# Patient Record
Sex: FEMALE | Race: BLACK OR AFRICAN AMERICAN | NOT HISPANIC OR LATINO | URBAN - METROPOLITAN AREA
[De-identification: names, ages, dates, MRNs, and addresses within clinical notes are randomized per-mention and may not be internally consistent; named-entity substitution may affect disease eponyms.]

---

## 2021-11-17 ENCOUNTER — EMERGENCY (EMERGENCY)
Facility: HOSPITAL | Age: 51
LOS: 0 days | Discharge: LEFT BEFORE DISCHARGE | End: 2021-11-17
Payer: SELF-PAY

## 2021-11-17 VITALS
HEIGHT: 64 IN | RESPIRATION RATE: 15 BRPM | WEIGHT: 139.99 LBS | TEMPERATURE: 98 F | SYSTOLIC BLOOD PRESSURE: 170 MMHG | DIASTOLIC BLOOD PRESSURE: 101 MMHG | OXYGEN SATURATION: 100 % | HEART RATE: 69 BPM

## 2021-11-17 VITALS
RESPIRATION RATE: 18 BRPM | HEART RATE: 69 BPM | DIASTOLIC BLOOD PRESSURE: 103 MMHG | SYSTOLIC BLOOD PRESSURE: 169 MMHG | OXYGEN SATURATION: 100 % | TEMPERATURE: 98 F

## 2021-11-17 DIAGNOSIS — Z91.013 ALLERGY TO SEAFOOD: ICD-10-CM

## 2021-11-17 DIAGNOSIS — I10 ESSENTIAL (PRIMARY) HYPERTENSION: ICD-10-CM

## 2021-11-17 DIAGNOSIS — Z53.29 PROCEDURE AND TREATMENT NOT CARRIED OUT BECAUSE OF PATIENT'S DECISION FOR OTHER REASONS: ICD-10-CM

## 2021-11-17 DIAGNOSIS — Z23 ENCOUNTER FOR IMMUNIZATION: ICD-10-CM

## 2021-11-17 DIAGNOSIS — Z91.012 ALLERGY TO EGGS: ICD-10-CM

## 2021-11-17 PROCEDURE — 99283 EMERGENCY DEPT VISIT LOW MDM: CPT

## 2021-11-17 RX ORDER — BNT162B2 0.23 MG/2.25ML
0.3 INJECTION, SUSPENSION INTRAMUSCULAR ONCE
Refills: 0 | Status: COMPLETED | OUTPATIENT
Start: 2021-11-17 | End: 2021-11-17

## 2021-11-17 RX ORDER — AMLODIPINE BESYLATE 2.5 MG/1
1 TABLET ORAL
Qty: 0 | Refills: 0 | DISCHARGE

## 2021-11-17 RX ORDER — AMLODIPINE BESYLATE 2.5 MG/1
10 TABLET ORAL ONCE
Refills: 0 | Status: COMPLETED | OUTPATIENT
Start: 2021-11-17 | End: 2021-11-17

## 2021-11-17 RX ADMIN — AMLODIPINE BESYLATE 10 MILLIGRAM(S): 2.5 TABLET ORAL at 20:27

## 2021-11-17 RX ADMIN — BNT162B2 0.3 MILLILITER(S): 0.23 INJECTION, SUSPENSION INTRAMUSCULAR at 20:05

## 2021-11-17 NOTE — ED ADULT NURSE REASSESSMENT NOTE - NS ED NURSE REASSESS COMMENT FT1
no adverse reaction noted from covid vaccine 2nd dose pt requests d/c papers alejandro simpson instructed pt he wants to wait to repeat bp after amlodipine dose given here has time to take effect pt to sign ama as she doesn't want to remain in er for ongoing bp management pt with known hx htn non compliant with rx amlodipine denies any headache or dizziness at present

## 2021-11-17 NOTE — ED PROVIDER NOTE - NSFOLLOWUPCLINICS_GEN_ALL_ED_FT
[facility] - Primary Care  Primary Care  [address] [place] [address]  Phone: [phone]  Fax:

## 2021-11-17 NOTE — ED ADULT NURSE NOTE - OBJECTIVE STATEMENT
received ft requesting 2nd dose pfizer vaccine 1st dose given 9/24/21 denies any symptoms or known exposure states needs it for her employer

## 2021-11-17 NOTE — ED PROVIDER NOTE - PROGRESS NOTE DETAILS
AAox3 non toxic looking afebrile sitting in bed in NAD. patient is not psychotic, not hearing voice and capable to make decisions. patient updated about her BP being elevated. patient given amlodipine 10 mg fo relevated BP. patient prefer to sign out against medical advise. patient educated of the risk associated to leaving against medical advice included but no limited to death, coma, irreversible neurological physical psychosocial damage, stroke heart attack, loc, syncope, worsening symptoms. witnessed by RN. patient remains asymptomatic. patient signed out against medical advised

## 2021-11-17 NOTE — ED PROVIDER NOTE - PATIENT PORTAL LINK FT
You can access the FollowMyHealth Patient Portal offered by [facility] by registering at the following website: [email or web address]. By joining [email or web address]’s FollowMyHealth portal, you will also be able to view your health information using other applications (apps) compatible with our system.

## 2021-11-17 NOTE — ED PROVIDER NOTE - NSFOLLOWUPINSTRUCTIONS_ED_ALL_ED_FT
Please schedule to follow up within 7 days for covid19      [facility] - Primary Care   Primary Care  [address] [place] [address]  Phone: [phone]  Fax:     please take tylenol 325 mg every 4 hours as needed for pain     please return to the ED for worsening symptoms ,

## 2021-11-17 NOTE — ED ADULT NURSE NOTE - NSICDXPASTMEDICALHX_GEN_ALL_CORE_FT
PAST MEDICAL HISTORY:  No pertinent past medical history PAST MEDICAL HISTORY:  HTN (hypertension)

## 2021-11-17 NOTE — ED PROVIDER NOTE - PHYSICAL EXAMINATION
General appearance AAox3 nontoxic looking afebrile sitting in bed in NAD, breathing comfortably, speak full sentence, no tripoding  Head : NCAT, no facial swelling, no bruise, no laceration, non TTP,  EYE : b/l eyes EOMI/PERRL, no nystagmus.   Mouth : Oropharynx moist and patent, no swelling, no edema, no tongue swelling, uvula midline. no exudate, no rash, no lesion   Neck : no cervical/paracervical spine tenderness, no meningeal signs, no neck rigidity, no lymph node swelling. FROM, no body step off  chest : no bruise, no deformity, non TTP, chest expanding equally b/l, no crepitus, no swelling,   lung : CTAB no w/r/r  abdomen : soft non tender, no distended, + BS x 4 quadrant, no guarding, no CVA tenderness, no organomegaly  spine :  moving all extremity equally b/l skin intact, dry not warm to touch. no bruise, no deformity, no bony step off, no erythema, no swelling, no infection. non TTP. FROM, no sensory deficit, distal pulse intact, cap refill < 2 second, able to bare weight. strength 5/5   LE :  skin dry not warm to touch. no bruise, no deformity, no bony step off, no infection. FROM, no sensory deficit, distal pulse intact, cap refill < 2 second, able to bare weight. ambulating steadily, strength 5/5

## 2021-11-17 NOTE — ED PROVIDER NOTE - CLINICAL SUMMARY MEDICAL DECISION MAKING FREE TEXT BOX
50 y/o male with pmhx HTN presented to the ED for second dose of pfizer vaccine. patient was found to have high bp at triage.    imp covid vaccine and HTN     plan   amlodipine   covid vaccine 2nd dose   observation for allergic reaction   patient education   PCP referral   reassess

## 2021-11-17 NOTE — ED PROVIDER NOTE - OBJECTIVE STATEMENT
50 y/o male with pmhx HTN presented to the ED for second dose of pfizer vaccine. patient was found to have high bp at triage. patient report not taking her bp medication this morning. patient denies ha, dizziness, loc syncope, cough, hemoptysis, cp, palpitation, sob, weakness, numbness, n/v/d bad pain, facial droop, facial asymmetry, tremor

## 2021-11-17 NOTE — ED ADULT NURSE REASSESSMENT NOTE - NS ED NURSE REASSESS COMMENT FT1
consent for covid vaccine obtained and in chart l detoid im injection given pt to be observed x 15 mins prior to d/c pt made aware and verbalized understanding

## 2022-08-30 NOTE — ED ADULT NURSE NOTE - NS ED NURSE RECORD ANOTHER VITAL SIGN
Airway  Performed by: Kaitlynn Foster MD  Authorized by: Kaitlynn Foster MD     Final Airway Type:  Endotracheal airway  Final Endotracheal Airway*:  ETT  ETT Size (mm)*:  7.5  Cuff*:  Regular  Technique Used for Successful ETT Placement:  Direct laryngoscopy  Devices/Methods Used in Placement*:  Mask  Intubation Procedure*:  Preoxygenation, ETCO2, Atraumatic, Dentition Unchanged and Pharynx Clear  Insertion Site:  Oral  Blade Type*:  MAC  Blade Size*:  3  Placement Verified by: auscultation and capnometry    Glottic View*:  2 - partial view of glottis  Attempts*:  1   Patient Identified, Procedure confirmed, Emergency equipment available and Safety protocols followed  Location:  OR  Urgency:  Elective  Difficult Airway: No    Indications for Airway Management:  Anesthesia  Mask Difficulty Assessment:  1 - vent by mask  Performed By:  Anesthesiologist  Anesthesiologist:  Kaitlynn Foster MD  Start Time: 8/30/2022 7:34 AM   Easy mask, DLx1 with MAC 3, base of arytenoids seen, atraumatic intubation    
Yes